# Patient Record
Sex: MALE | Race: BLACK OR AFRICAN AMERICAN | NOT HISPANIC OR LATINO
[De-identification: names, ages, dates, MRNs, and addresses within clinical notes are randomized per-mention and may not be internally consistent; named-entity substitution may affect disease eponyms.]

---

## 2022-04-13 PROBLEM — Z00.00 ENCOUNTER FOR PREVENTIVE HEALTH EXAMINATION: Status: ACTIVE | Noted: 2022-04-13

## 2022-04-29 ENCOUNTER — APPOINTMENT (OUTPATIENT)
Dept: ORTHOPEDIC SURGERY | Facility: CLINIC | Age: 42
End: 2022-04-29
Payer: OTHER MISCELLANEOUS

## 2022-04-29 VITALS — WEIGHT: 230 LBS | BODY MASS INDEX: 30.48 KG/M2 | HEIGHT: 73 IN

## 2022-04-29 DIAGNOSIS — Z78.9 OTHER SPECIFIED HEALTH STATUS: ICD-10-CM

## 2022-04-29 PROCEDURE — 99213 OFFICE O/P EST LOW 20 MIN: CPT

## 2022-04-29 PROCEDURE — 99072 ADDL SUPL MATRL&STAF TM PHE: CPT

## 2022-04-29 NOTE — HISTORY OF PRESENT ILLNESS
[Work related] : work related [5] : 5 [Dull/Aching] : dull/aching [Throbbing] : throbbing [Intermittent] : intermittent [Nothing helps with pain getting better] : Nothing helps with pain getting better [Not working due to injury] : Work status: not working due to injury [de-identified] : 4/29 emg: unremarkable. follow up evaluation for emg results of his bilateral upper extremities\par Patient is here as a result of work related injury reported 2/10/22 [] : no [FreeTextEntry1] : right wrist  [FreeTextEntry3] : 2/10/22 [de-identified] : activities, twisting and turning wrist  [de-identified] : emg [de-identified] : none

## 2022-05-27 ENCOUNTER — APPOINTMENT (OUTPATIENT)
Dept: ORTHOPEDIC SURGERY | Facility: CLINIC | Age: 42
End: 2022-05-27
Payer: OTHER MISCELLANEOUS

## 2022-05-27 VITALS — BODY MASS INDEX: 30.48 KG/M2 | WEIGHT: 230 LBS | HEIGHT: 73 IN

## 2022-05-27 PROCEDURE — 99072 ADDL SUPL MATRL&STAF TM PHE: CPT

## 2022-05-27 PROCEDURE — 99213 OFFICE O/P EST LOW 20 MIN: CPT

## 2022-05-27 NOTE — HISTORY OF PRESENT ILLNESS
[Work related] : work related [5] : 5 [Dull/Aching] : dull/aching [Throbbing] : throbbing [Intermittent] : intermittent [Nothing helps with pain getting better] : Nothing helps with pain getting better [Not working due to injury] : Work status: not working due to injury [de-identified] : 4/29 emg: unremarkable. follow up evaluation for emg results of his bilateral upper extremities\par Patient is here as a result of work related injury reported 2/10/22 [] : no [FreeTextEntry1] : right wrist  [FreeTextEntry3] : 2/10/22 [FreeTextEntry5] : 5/27 pain improved [de-identified] : activities, twisting and turning wrist  [de-identified] : emg [de-identified] : none

## 2022-06-24 ENCOUNTER — APPOINTMENT (OUTPATIENT)
Dept: ORTHOPEDIC SURGERY | Facility: CLINIC | Age: 42
End: 2022-06-24
Payer: OTHER MISCELLANEOUS

## 2022-06-24 PROCEDURE — 99213 OFFICE O/P EST LOW 20 MIN: CPT

## 2022-06-24 PROCEDURE — 99072 ADDL SUPL MATRL&STAF TM PHE: CPT

## 2022-06-29 NOTE — HISTORY OF PRESENT ILLNESS
[5] : 5 [3] : 3 [Dull/Aching] : dull/aching [Localized] : localized [Throbbing] : throbbing [Not working due to injury] : Work status: not working due to injury [] : Post Surgical Visit: no [FreeTextEntry1] : right wrist [FreeTextEntry5] : 6/24 feels better in brace [de-identified] : wrist brace

## 2022-06-29 NOTE — WORK
[Partial] : partial [Does not reveal pre-existing condition(s) that may affect treatment/prognosis] : does not reveal pre-existing condition(s) that may affect treatment/prognosis [Cannot return to work because ________] : cannot return to work because [unfilled]

## 2022-07-29 ENCOUNTER — APPOINTMENT (OUTPATIENT)
Dept: ORTHOPEDIC SURGERY | Facility: CLINIC | Age: 42
End: 2022-07-29

## 2022-07-29 VITALS — WEIGHT: 230 LBS | BODY MASS INDEX: 30.48 KG/M2 | HEIGHT: 73 IN

## 2022-07-29 PROCEDURE — 99072 ADDL SUPL MATRL&STAF TM PHE: CPT

## 2022-07-29 PROCEDURE — 99213 OFFICE O/P EST LOW 20 MIN: CPT

## 2022-07-29 NOTE — HISTORY OF PRESENT ILLNESS
[5] : 5 [3] : 3 [Dull/Aching] : dull/aching [Localized] : localized [Throbbing] : throbbing [Not working due to injury] : Work status: not working due to injury [] : Post Surgical Visit: no [FreeTextEntry1] : right wrist [FreeTextEntry5] : 6/24 feels better in brace\par 7/29 continued pain [de-identified] : wrist brace

## 2022-08-11 ENCOUNTER — FORM ENCOUNTER (OUTPATIENT)
Age: 42
End: 2022-08-11

## 2022-09-12 ENCOUNTER — APPOINTMENT (OUTPATIENT)
Dept: ORTHOPEDIC SURGERY | Facility: CLINIC | Age: 42
End: 2022-09-12

## 2022-09-12 VITALS — BODY MASS INDEX: 30.48 KG/M2 | HEIGHT: 73 IN | WEIGHT: 230 LBS

## 2022-09-12 PROCEDURE — 99213 OFFICE O/P EST LOW 20 MIN: CPT

## 2022-09-12 PROCEDURE — 99072 ADDL SUPL MATRL&STAF TM PHE: CPT

## 2022-09-12 NOTE — HISTORY OF PRESENT ILLNESS
[Work related] : work related [Sudden] : sudden [Sharp] : sharp [Shooting] : shooting [Stabbing] : stabbing [Constant] : constant [Massage] : massage [Not working due to injury] : Work status: not working due to injury [5] : 5 [3] : 3 [Dull/Aching] : dull/aching [Localized] : localized [Throbbing] : throbbing [de-identified] : follow up evaluation for a work related injury reported 2/10/22 [] : Post Surgical Visit: no [FreeTextEntry1] : right wrist [FreeTextEntry3] : 2/10/22 [FreeTextEntry5] : 6/24 feels better in brace\par 7/29 continued pain\par 9/12 ulnar wrist pain continues [FreeTextEntry9] : brace [de-identified] : bending and activities  [de-identified] : xrays [de-identified] : wrist brace

## 2022-10-11 ENCOUNTER — APPOINTMENT (OUTPATIENT)
Dept: ORTHOPEDIC SURGERY | Facility: CLINIC | Age: 42
End: 2022-10-11

## 2022-10-11 VITALS — WEIGHT: 230 LBS | BODY MASS INDEX: 30.48 KG/M2 | HEIGHT: 73 IN

## 2022-10-11 PROCEDURE — 99213 OFFICE O/P EST LOW 20 MIN: CPT

## 2022-10-11 PROCEDURE — 99072 ADDL SUPL MATRL&STAF TM PHE: CPT

## 2022-10-11 NOTE — HISTORY OF PRESENT ILLNESS
[Dull/Aching] : dull/aching [Sharp] : sharp [Not working due to injury] : Work status: not working due to injury [Work related] : work related [Sudden] : sudden [5] : 5 [3] : 3 [Localized] : localized [Shooting] : shooting [Stabbing] : stabbing [Throbbing] : throbbing [Constant] : constant [Massage] : massage [de-identified] : follow up evaluation for a work related injury reported 2/10/22 [] : Post Surgical Visit: no [FreeTextEntry1] : right wrist [FreeTextEntry3] : 2/10/22 [FreeTextEntry5] : 6/24 feels better in brace\par 7/29 continued pain\par 9/12 ulnar wrist pain continues\par 10/11 improved by bracing [FreeTextEntry9] : brace [de-identified] : bending and activities  [de-identified] : xrays [de-identified] : wrist brace

## 2022-11-14 ENCOUNTER — APPOINTMENT (OUTPATIENT)
Dept: ORTHOPEDIC SURGERY | Facility: CLINIC | Age: 42
End: 2022-11-14

## 2022-11-14 VITALS — HEIGHT: 60 IN

## 2022-11-14 PROCEDURE — 99072 ADDL SUPL MATRL&STAF TM PHE: CPT

## 2022-11-14 PROCEDURE — 99213 OFFICE O/P EST LOW 20 MIN: CPT

## 2022-11-14 NOTE — HISTORY OF PRESENT ILLNESS
[Work related] : work related [Sudden] : sudden [5] : 5 [3] : 3 [Dull/Aching] : dull/aching [Localized] : localized [Sharp] : sharp [Shooting] : shooting [Stabbing] : stabbing [Throbbing] : throbbing [Constant] : constant [Massage] : massage [Not working due to injury] : Work status: not working due to injury [de-identified] : follow up evaluation for a work related injury reported 2/10/22 [] : Post Surgical Visit: no [FreeTextEntry1] : right wrist [FreeTextEntry3] : 2/10/22 [FreeTextEntry5] : 6/24 feels better in brace\par 7/29 continued pain\par 9/12 ulnar wrist pain continues\par 10/11 improved by bracing\par 11/14 overall improved [FreeTextEntry9] : brace [de-identified] : bending and activities  [de-identified] : xrays [de-identified] : wrist brace

## 2022-12-30 ENCOUNTER — APPOINTMENT (OUTPATIENT)
Dept: ORTHOPEDIC SURGERY | Facility: CLINIC | Age: 42
End: 2022-12-30
Payer: OTHER MISCELLANEOUS

## 2022-12-30 VITALS — HEIGHT: 73 IN | WEIGHT: 230 LBS | BODY MASS INDEX: 30.48 KG/M2

## 2022-12-30 PROCEDURE — 99072 ADDL SUPL MATRL&STAF TM PHE: CPT

## 2022-12-30 PROCEDURE — 99213 OFFICE O/P EST LOW 20 MIN: CPT

## 2022-12-30 NOTE — HISTORY OF PRESENT ILLNESS
[Work related] : work related [Sudden] : sudden [Dull/Aching] : dull/aching [Localized] : localized [Sharp] : sharp [Throbbing] : throbbing [Massage] : massage [Not working due to injury] : Work status: not working due to injury [de-identified] : follow up evaluation for a work related injury reported 2/10/22 [5] : 5 [3] : 3 [Shooting] : shooting [Stabbing] : stabbing [Constant] : constant [] : Post Surgical Visit: no [FreeTextEntry1] : right wrist [FreeTextEntry3] : 2/10/22 [FreeTextEntry5] : 6/24 feels better in brace\par 7/29 continued pain\par 9/12 ulnar wrist pain continues\par 10/11 improved by bracing\par 11/14 overall improved\par 12/30 feels better [FreeTextEntry6] : soreness [FreeTextEntry9] : brace [de-identified] : bending and activities  [de-identified] : xrays [de-identified] : wrist brace

## 2023-01-30 ENCOUNTER — APPOINTMENT (OUTPATIENT)
Dept: ORTHOPEDIC SURGERY | Facility: CLINIC | Age: 43
End: 2023-01-30
Payer: OTHER MISCELLANEOUS

## 2023-01-30 VITALS — BODY MASS INDEX: 30.48 KG/M2 | HEIGHT: 73 IN | WEIGHT: 230 LBS

## 2023-01-30 PROCEDURE — 99213 OFFICE O/P EST LOW 20 MIN: CPT | Mod: 25

## 2023-01-30 PROCEDURE — 76882 US LMTD JT/FCL EVL NVASC XTR: CPT | Mod: 26

## 2023-01-30 PROCEDURE — 20606 DRAIN/INJ JOINT/BURSA W/US: CPT

## 2023-01-30 PROCEDURE — 99072 ADDL SUPL MATRL&STAF TM PHE: CPT

## 2023-01-30 NOTE — HISTORY OF PRESENT ILLNESS
[Work related] : work related [Sudden] : sudden [5] : 5 [3] : 3 [Dull/Aching] : dull/aching [Localized] : localized [Sharp] : sharp [Shooting] : shooting [Stabbing] : stabbing [Throbbing] : throbbing [Constant] : constant [Massage] : massage [Not working due to injury] : Work status: not working due to injury [de-identified] : follow up evaluation for a work related injury reported 2/10/22 [] : Post Surgical Visit: no [FreeTextEntry1] : right wrist [FreeTextEntry3] : 2/10/22 [FreeTextEntry5] : 6/24 feels better in brace\par 7/29 continued pain\par 9/12 ulnar wrist pain continues\par 10/11 improved by bracing\par 11/14 overall improved\par 12/30 feels better\par 1/30 continued ulnar wrist pain [FreeTextEntry6] : soreness [FreeTextEntry9] : brace [de-identified] : bending and activities  [de-identified] : xrays [de-identified] : wrist brace

## 2023-02-27 ENCOUNTER — APPOINTMENT (OUTPATIENT)
Dept: ORTHOPEDIC SURGERY | Facility: CLINIC | Age: 43
End: 2023-02-27
Payer: OTHER MISCELLANEOUS

## 2023-02-27 VITALS — HEIGHT: 73 IN | BODY MASS INDEX: 30.48 KG/M2 | WEIGHT: 230 LBS

## 2023-02-27 PROCEDURE — 99213 OFFICE O/P EST LOW 20 MIN: CPT

## 2023-02-27 PROCEDURE — 99072 ADDL SUPL MATRL&STAF TM PHE: CPT

## 2023-02-27 NOTE — HISTORY OF PRESENT ILLNESS
[Work related] : work related [Sudden] : sudden [5] : 5 [3] : 3 [Dull/Aching] : dull/aching [Localized] : localized [Sharp] : sharp [Shooting] : shooting [Stabbing] : stabbing [Throbbing] : throbbing [Constant] : constant [Massage] : massage [Not working due to injury] : Work status: not working due to injury [de-identified] : follow up evaluation for a work related injury reported 2/10/22 [] : Post Surgical Visit: no [FreeTextEntry1] : right wrist [FreeTextEntry3] : 2/10/22 [FreeTextEntry5] : 6/24 feels better in brace\par 7/29 continued pain\par 9/12 ulnar wrist pain continues\par 10/11 improved by bracing\par 11/14 overall improved\par 12/30 feels better\par 1/30 continued ulnar wrist pain\par 2/27 much improved by injection and therapy [FreeTextEntry6] : soreness [FreeTextEntry9] : brace [de-identified] : bending and activities  [de-identified] : xrays [de-identified] : wrist brace

## 2023-02-27 NOTE — REASON FOR VISIT
[FreeTextEntry2] :  2/10/22 Patient is here today for a follow up on the right wrist. Patient feeling better still doing physical therapy.

## 2023-03-21 ENCOUNTER — APPOINTMENT (OUTPATIENT)
Dept: ORTHOPEDIC SURGERY | Facility: CLINIC | Age: 43
End: 2023-03-21
Payer: OTHER MISCELLANEOUS

## 2023-03-21 VITALS — BODY MASS INDEX: 30.48 KG/M2 | WEIGHT: 230 LBS | HEIGHT: 73 IN

## 2023-03-21 PROCEDURE — 99213 OFFICE O/P EST LOW 20 MIN: CPT

## 2023-03-23 ENCOUNTER — NON-APPOINTMENT (OUTPATIENT)
Age: 43
End: 2023-03-23

## 2023-03-23 NOTE — HISTORY OF PRESENT ILLNESS
[Work related] : work related [Sudden] : sudden [Dull/Aching] : dull/aching [Localized] : localized [Sharp] : sharp [Shooting] : shooting [Stabbing] : stabbing [Throbbing] : throbbing [Constant] : constant [Massage] : massage [Not working due to injury] : Work status: not working due to injury [0] : 0 [de-identified] : follow up evaluation for a work related injury reported 2/10/22 [] : Post Surgical Visit: no [FreeTextEntry1] : right wrist [FreeTextEntry3] : 2/10/22 [FreeTextEntry5] : \par \par 11/14 overall improved\par 12/30 feels better\par 1/30 continued ulnar wrist pain\par 3/21 manageable pain [FreeTextEntry6] : soreness [de-identified] : bending and activities  [de-identified] : xrays

## 2023-05-02 ENCOUNTER — APPOINTMENT (OUTPATIENT)
Dept: ORTHOPEDIC SURGERY | Facility: CLINIC | Age: 43
End: 2023-05-02

## 2024-03-05 ENCOUNTER — APPOINTMENT (OUTPATIENT)
Dept: ORTHOPEDIC SURGERY | Facility: CLINIC | Age: 44
End: 2024-03-05
Payer: OTHER MISCELLANEOUS

## 2024-03-05 PROCEDURE — 99075 MEDICAL TESTIMONY: CPT

## 2024-03-12 ENCOUNTER — APPOINTMENT (OUTPATIENT)
Dept: ORTHOPEDIC SURGERY | Facility: CLINIC | Age: 44
End: 2024-03-12
Payer: OTHER MISCELLANEOUS

## 2024-03-12 PROCEDURE — 99075 MEDICAL TESTIMONY: CPT

## 2024-04-05 ENCOUNTER — APPOINTMENT (OUTPATIENT)
Dept: ORTHOPEDIC SURGERY | Facility: CLINIC | Age: 44
End: 2024-04-05
Payer: OTHER MISCELLANEOUS

## 2024-04-05 VITALS — WEIGHT: 230 LBS | BODY MASS INDEX: 30.48 KG/M2 | HEIGHT: 73 IN

## 2024-04-05 PROCEDURE — 99213 OFFICE O/P EST LOW 20 MIN: CPT

## 2024-04-05 NOTE — WORK
[Partial] : partial [Does not reveal pre-existing condition(s) that may affect treatment/prognosis] : does not reveal pre-existing condition(s) that may affect treatment/prognosis [Cannot return to work because ________] : cannot return to work because [unfilled] [Is there permanent partial impairment?] : Yes [FreeTextEntry6] : 20% r hand

## 2024-04-05 NOTE — HISTORY OF PRESENT ILLNESS
[Work related] : work related [Sudden] : sudden [0] : 0 [Dull/Aching] : dull/aching [Massage] : massage [de-identified] : follow up evaluation for a work related injury reported 2/10/22 [Localized] : localized [Sharp] : sharp [Shooting] : shooting [Stabbing] : stabbing [Throbbing] : throbbing [Constant] : constant [Not working due to injury] : Work status: not working due to injury [] : Post Surgical Visit: no [FreeTextEntry1] : right wrist [FreeTextEntry3] : 2/10/22 [FreeTextEntry5] : 11/14 overall improved 12/30 feels better 1/30 continued ulnar wrist pain 3/21 manageable pain 4/5 achy with gripping [FreeTextEntry6] : soreness [de-identified] : bending and activities  [de-identified] : xrays

## 2024-04-11 ENCOUNTER — NON-APPOINTMENT (OUTPATIENT)
Age: 44
End: 2024-04-11

## 2024-04-11 ENCOUNTER — APPOINTMENT (OUTPATIENT)
Dept: ORTHOPEDIC SURGERY | Facility: CLINIC | Age: 44
End: 2024-04-11
Payer: COMMERCIAL

## 2024-04-11 VITALS — WEIGHT: 230 LBS | BODY MASS INDEX: 30.48 KG/M2 | HEIGHT: 73 IN

## 2024-04-11 DIAGNOSIS — M47.814 SPONDYLOSIS W/OUT MYELOPATHY OR RADICULOPATHY, THORACIC REGION: ICD-10-CM

## 2024-04-11 DIAGNOSIS — Z78.9 OTHER SPECIFIED HEALTH STATUS: ICD-10-CM

## 2024-04-11 PROCEDURE — 72040 X-RAY EXAM NECK SPINE 2-3 VW: CPT

## 2024-04-11 PROCEDURE — 99204 OFFICE O/P NEW MOD 45 MIN: CPT

## 2024-04-11 NOTE — HISTORY OF PRESENT ILLNESS
[Dull/Aching] : dull/aching [Localized] : localized [Nothing helps with pain getting better] : Nothing helps with pain getting better [Full time] : Work status: full time [6] : 6 [9] : 9 [Intermittent] : intermittent [Household chores] : household chores [Leisure] : leisure [Work] : work [Sleep] : sleep [Sexual activity] : sexual activity [Social interactions] : social interactions [de-identified] : 04/11/2024 :BRANDI MOSES , a 43 year old RHD male, pain in the base of the neck into area between scapular, onset a few years worst last week - no radiation down the arms - no loss of fine motor   describes history of multiple MVCs for which he has had it looked at before - but last was 6 years ago   Tried different pillows  No PT/chiro/accupuncture No millie No prescription  No PRIOr neck surgery  xrays today: C spine - mild spondylosis   No baseline medicatoins 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

## 2024-04-11 NOTE — DISCUSSION/SUMMARY
[Medication Risks Reviewed] : Medication risks reviewed [de-identified] : reviewed the case and the imaging with the patient cervical pain - likely disc injury PT MRI C spine  mobic  fu to review the MRi

## 2024-04-12 ENCOUNTER — APPOINTMENT (OUTPATIENT)
Dept: MRI IMAGING | Facility: CLINIC | Age: 44
End: 2024-04-12
Payer: COMMERCIAL

## 2024-04-12 PROCEDURE — 72141 MRI NECK SPINE W/O DYE: CPT

## 2024-04-19 ENCOUNTER — APPOINTMENT (OUTPATIENT)
Dept: ORTHOPEDIC SURGERY | Facility: CLINIC | Age: 44
End: 2024-04-19
Payer: COMMERCIAL

## 2024-04-19 DIAGNOSIS — M54.12 RADICULOPATHY, CERVICAL REGION: ICD-10-CM

## 2024-04-19 PROCEDURE — 99215 OFFICE O/P EST HI 40 MIN: CPT

## 2024-04-19 RX ORDER — MELOXICAM 7.5 MG/1
7.5 TABLET ORAL
Qty: 40 | Refills: 0 | Status: ACTIVE | COMMUNITY
Start: 2024-04-11 | End: 1900-01-01

## 2024-05-31 ENCOUNTER — APPOINTMENT (OUTPATIENT)
Dept: ORTHOPEDIC SURGERY | Facility: CLINIC | Age: 44
End: 2024-05-31

## 2024-05-31 DIAGNOSIS — M47.812 SPONDYLOSIS W/OUT MYELOPATHY OR RADICULOPATHY, CERVICAL REGION: ICD-10-CM

## 2024-05-31 DIAGNOSIS — M54.2 CERVICALGIA: ICD-10-CM

## 2024-06-04 PROBLEM — M54.2 CERVICALGIA: Status: ACTIVE | Noted: 2024-04-11

## 2024-06-04 PROBLEM — M47.812 CERVICAL SPONDYLOSIS: Status: ACTIVE | Noted: 2024-04-11

## 2024-06-04 NOTE — DISCUSSION/SUMMARY
[Medication Risks Reviewed] : Medication risks reviewed [Surgical risks reviewed] : Surgical risks reviewed [de-identified] : reviewed the case and the imaging with the patient discussion of tx options  suspect the disc at C6-7 is the main issues  cont with PT  resent the mobic  if that not working consider HUNTER  if none of that is working consider surgery - surgery would likely be acdf C6-7 we discussed the surgery which would be be acdf - rec conservative tx   has been out of work  would like to return to work  we can take care of the FMLA paperwork  may have intermittent episodes where he needs to come out of work - he is going to return to work full duty

## 2024-06-04 NOTE — HISTORY OF PRESENT ILLNESS
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  No loss of bb control   [employer]  4/19/24: here for fu - plan at last was "reviewed the case and the imaging with the patient cervical pain - likely disc injury PT MRI C spine  mobic  fu to review the MRi" - overall the pain remains in the neck - arms okay  didnt try the medication yet  No loss of fine motor  started with PT - has only been to a couple of sessions  MRi C spine - see report - [facility]    05/31/24 - pt is her for FU on c spine, pain remains in the neck PT helps a bit  no new other treatment  no loss of fine motor functoin       [FreeTextEntry5] : BRANDI is here today for c-spine MRI review. states symptoms are the same since last visit. attending PT.

## 2024-06-10 ENCOUNTER — APPOINTMENT (OUTPATIENT)
Dept: ORTHOPEDIC SURGERY | Facility: CLINIC | Age: 44
End: 2024-06-10
Payer: OTHER MISCELLANEOUS

## 2024-06-10 VITALS — WEIGHT: 230 LBS | HEIGHT: 73 IN | BODY MASS INDEX: 30.48 KG/M2

## 2024-06-10 DIAGNOSIS — G56.21 LESION OF ULNAR NERVE, RIGHT UPPER LIMB: ICD-10-CM

## 2024-06-10 DIAGNOSIS — S69.81XD OTHER SPECIFIED INJURIES OF RIGHT WRIST, HAND AND FINGER(S), SUBSEQUENT ENCOUNTER: ICD-10-CM

## 2024-06-10 PROCEDURE — 99215 OFFICE O/P EST HI 40 MIN: CPT

## 2024-06-10 NOTE — HISTORY OF PRESENT ILLNESS
[Work related] : work related [0] : 0 [de-identified] : follow up evaluation for a work related injury reported 2/10/22 [Sudden] : sudden [Dull/Aching] : dull/aching [Localized] : localized [Sharp] : sharp [Shooting] : shooting [Stabbing] : stabbing [Throbbing] : throbbing [Constant] : constant [Massage] : massage [Not working due to injury] : Work status: not working due to injury [] : Post Surgical Visit: no [FreeTextEntry1] : right wrist [FreeTextEntry3] : 2/10/22 [FreeTextEntry5] : 11/14 overall improved 12/30 feels better 1/30 continued ulnar wrist pain 3/21 manageable pain 4/5 achy with gripping 6/10 manageable pain [FreeTextEntry6] : soreness [de-identified] : bending and activities  [de-identified] : xrays